# Patient Record
Sex: MALE | Race: WHITE | NOT HISPANIC OR LATINO | Employment: OTHER | URBAN - METROPOLITAN AREA
[De-identification: names, ages, dates, MRNs, and addresses within clinical notes are randomized per-mention and may not be internally consistent; named-entity substitution may affect disease eponyms.]

---

## 2021-11-02 ENCOUNTER — HOSPITAL ENCOUNTER (EMERGENCY)
Facility: HOSPITAL | Age: 46
Discharge: HOME | End: 2021-11-02
Attending: EMERGENCY MEDICINE
Payer: OTHER GOVERNMENT

## 2021-11-02 VITALS
HEIGHT: 67 IN | BODY MASS INDEX: 23.54 KG/M2 | HEART RATE: 81 BPM | SYSTOLIC BLOOD PRESSURE: 131 MMHG | RESPIRATION RATE: 18 BRPM | DIASTOLIC BLOOD PRESSURE: 91 MMHG | WEIGHT: 150 LBS | TEMPERATURE: 98 F | OXYGEN SATURATION: 96 %

## 2021-11-02 DIAGNOSIS — R31.0 GROSS HEMATURIA: Primary | ICD-10-CM

## 2021-11-02 LAB
ANION GAP SERPL CALC-SCNC: 7 MMOL/L (ref 8–16)
BACTERIA #/AREA URNS HPF: ABNORMAL /HPF
BASOPHILS # BLD AUTO: 0.04 K/UL (ref 0–0.2)
BASOPHILS NFR BLD: 0.6 % (ref 0–1.9)
BILIRUB UR QL STRIP: NEGATIVE
BUN SERPL-MCNC: 12 MG/DL (ref 6–20)
CALCIUM SERPL-MCNC: 9.7 MG/DL (ref 8.7–10.5)
CHLORIDE SERPL-SCNC: 104 MMOL/L (ref 95–110)
CLARITY UR: ABNORMAL
CO2 SERPL-SCNC: 29 MMOL/L (ref 23–29)
COLOR UR: ABNORMAL
CREAT SERPL-MCNC: 1.1 MG/DL (ref 0.5–1.4)
DIFFERENTIAL METHOD: ABNORMAL
EOSINOPHIL # BLD AUTO: 0.2 K/UL (ref 0–0.5)
EOSINOPHIL NFR BLD: 2.5 % (ref 0–8)
ERYTHROCYTE [DISTWIDTH] IN BLOOD BY AUTOMATED COUNT: 11.9 % (ref 11.5–14.5)
EST. GFR  (AFRICAN AMERICAN): >60 ML/MIN/1.73 M^2
EST. GFR  (NON AFRICAN AMERICAN): >60 ML/MIN/1.73 M^2
GLUCOSE SERPL-MCNC: 98 MG/DL (ref 70–110)
GLUCOSE UR QL STRIP: NEGATIVE
HCT VFR BLD AUTO: 48.1 % (ref 40–54)
HGB BLD-MCNC: 16.2 G/DL (ref 14–18)
HGB UR QL STRIP: ABNORMAL
HYALINE CASTS #/AREA URNS LPF: 0 /LPF
IMM GRANULOCYTES # BLD AUTO: 0.03 K/UL (ref 0–0.04)
IMM GRANULOCYTES NFR BLD AUTO: 0.4 % (ref 0–0.5)
KETONES UR QL STRIP: NEGATIVE
LEUKOCYTE ESTERASE UR QL STRIP: ABNORMAL
LYMPHOCYTES # BLD AUTO: 1.3 K/UL (ref 1–4.8)
LYMPHOCYTES NFR BLD: 17.7 % (ref 18–48)
MCH RBC QN AUTO: 30.2 PG (ref 27–31)
MCHC RBC AUTO-ENTMCNC: 33.7 G/DL (ref 32–36)
MCV RBC AUTO: 90 FL (ref 82–98)
MICROSCOPIC COMMENT: ABNORMAL
MONOCYTES # BLD AUTO: 0.5 K/UL (ref 0.3–1)
MONOCYTES NFR BLD: 6.8 % (ref 4–15)
NEUTROPHILS # BLD AUTO: 5.2 K/UL (ref 1.8–7.7)
NEUTROPHILS NFR BLD: 72 % (ref 38–73)
NITRITE UR QL STRIP: NEGATIVE
NRBC BLD-RTO: 0 /100 WBC
PH UR STRIP: 6 [PH] (ref 5–8)
PLATELET # BLD AUTO: 267 K/UL (ref 150–450)
PMV BLD AUTO: 9.5 FL (ref 9.2–12.9)
POTASSIUM SERPL-SCNC: 4.5 MMOL/L (ref 3.5–5.1)
PROT UR QL STRIP: ABNORMAL
RBC # BLD AUTO: 5.37 M/UL (ref 4.6–6.2)
RBC #/AREA URNS HPF: >100 /HPF (ref 0–4)
SODIUM SERPL-SCNC: 140 MMOL/L (ref 136–145)
SP GR UR STRIP: >=1.03 (ref 1–1.03)
SQUAMOUS #/AREA URNS HPF: 4 /HPF
URN SPEC COLLECT METH UR: ABNORMAL
UROBILINOGEN UR STRIP-ACNC: NEGATIVE EU/DL
WBC # BLD AUTO: 7.19 K/UL (ref 3.9–12.7)
WBC #/AREA URNS HPF: 27 /HPF (ref 0–5)

## 2021-11-02 PROCEDURE — 85025 COMPLETE CBC W/AUTO DIFF WBC: CPT | Performed by: EMERGENCY MEDICINE

## 2021-11-02 PROCEDURE — 80048 BASIC METABOLIC PNL TOTAL CA: CPT | Performed by: EMERGENCY MEDICINE

## 2021-11-02 PROCEDURE — 87086 URINE CULTURE/COLONY COUNT: CPT | Performed by: EMERGENCY MEDICINE

## 2021-11-02 PROCEDURE — 36415 COLL VENOUS BLD VENIPUNCTURE: CPT | Performed by: EMERGENCY MEDICINE

## 2021-11-02 PROCEDURE — 99283 EMERGENCY DEPT VISIT LOW MDM: CPT | Mod: 25

## 2021-11-02 PROCEDURE — 81000 URINALYSIS NONAUTO W/SCOPE: CPT | Performed by: EMERGENCY MEDICINE

## 2021-11-02 RX ORDER — SODIUM CHLORIDE 9 MG/ML
INJECTION, SOLUTION INTRAVENOUS
Status: DISCONTINUED | OUTPATIENT
Start: 2021-11-02 | End: 2021-11-02

## 2021-11-03 LAB — BACTERIA UR CULT: NO GROWTH

## 2021-11-04 ENCOUNTER — HOSPITAL ENCOUNTER (EMERGENCY)
Facility: HOSPITAL | Age: 46
Discharge: HOME | End: 2021-11-05
Attending: EMERGENCY MEDICINE

## 2021-11-04 DIAGNOSIS — R31.9 URINARY TRACT INFECTION WITH HEMATURIA, SITE UNSPECIFIED: Primary | ICD-10-CM

## 2021-11-04 DIAGNOSIS — M54.9 BACK PAIN, UNSPECIFIED BACK LOCATION, UNSPECIFIED BACK PAIN LATERALITY, UNSPECIFIED CHRONICITY: ICD-10-CM

## 2021-11-04 DIAGNOSIS — N39.0 URINARY TRACT INFECTION WITH HEMATURIA, SITE UNSPECIFIED: Primary | ICD-10-CM

## 2021-11-04 LAB
BACTERIA #/AREA URNS HPF: NEGATIVE /HPF
BILIRUB UR QL STRIP: NEGATIVE
CLARITY UR: ABNORMAL
COLOR UR: ABNORMAL
GLUCOSE UR QL STRIP: NEGATIVE
HGB UR QL STRIP: ABNORMAL
HYALINE CASTS #/AREA URNS LPF: 1 /LPF
KETONES UR QL STRIP: NEGATIVE
LEUKOCYTE ESTERASE UR QL STRIP: ABNORMAL
MICROSCOPIC COMMENT: ABNORMAL
NITRITE UR QL STRIP: NEGATIVE
PH UR STRIP: 6 [PH] (ref 5–8)
PROT UR QL STRIP: ABNORMAL
RBC #/AREA URNS HPF: >100 /HPF (ref 0–4)
SP GR UR STRIP: 1.01 (ref 1–1.03)
SQUAMOUS #/AREA URNS HPF: 2 /HPF
URN SPEC COLLECT METH UR: ABNORMAL
UROBILINOGEN UR STRIP-ACNC: NEGATIVE EU/DL
WBC #/AREA URNS HPF: 9 /HPF (ref 0–5)

## 2021-11-04 PROCEDURE — 81001 URINALYSIS AUTO W/SCOPE: CPT | Performed by: NURSE PRACTITIONER

## 2021-11-04 PROCEDURE — 99284 EMERGENCY DEPT VISIT MOD MDM: CPT | Mod: 25

## 2021-11-05 VITALS
BODY MASS INDEX: 24.11 KG/M2 | TEMPERATURE: 98 F | OXYGEN SATURATION: 97 % | HEIGHT: 66 IN | WEIGHT: 150 LBS | SYSTOLIC BLOOD PRESSURE: 129 MMHG | RESPIRATION RATE: 17 BRPM | DIASTOLIC BLOOD PRESSURE: 92 MMHG | HEART RATE: 80 BPM

## 2021-11-05 PROCEDURE — 96372 THER/PROPH/DIAG INJ SC/IM: CPT | Mod: 59

## 2021-11-05 PROCEDURE — 25000003 PHARM REV CODE 250: Performed by: EMERGENCY MEDICINE

## 2021-11-05 PROCEDURE — 63600175 PHARM REV CODE 636 W HCPCS: Performed by: EMERGENCY MEDICINE

## 2021-11-05 RX ORDER — LIDOCAINE HYDROCHLORIDE 10 MG/ML
1 INJECTION, SOLUTION EPIDURAL; INFILTRATION; INTRACAUDAL; PERINEURAL
Status: COMPLETED | OUTPATIENT
Start: 2021-11-05 | End: 2021-11-05

## 2021-11-05 RX ORDER — DOXYCYCLINE 100 MG/1
100 CAPSULE ORAL 2 TIMES DAILY
Qty: 20 CAPSULE | Refills: 0 | Status: SHIPPED | OUTPATIENT
Start: 2021-11-05 | End: 2021-11-15

## 2021-11-05 RX ORDER — CEFTRIAXONE 1 G/1
1 INJECTION, POWDER, FOR SOLUTION INTRAMUSCULAR; INTRAVENOUS
Status: COMPLETED | OUTPATIENT
Start: 2021-11-05 | End: 2021-11-05

## 2021-11-05 RX ADMIN — CEFTRIAXONE SODIUM 1 G: 1 INJECTION, POWDER, FOR SOLUTION INTRAMUSCULAR; INTRAVENOUS at 12:11

## 2021-11-05 RX ADMIN — LIDOCAINE HYDROCHLORIDE 10 MG: 10 INJECTION, SOLUTION EPIDURAL; INFILTRATION; INTRACAUDAL; PERINEURAL at 12:11

## 2021-12-18 ENCOUNTER — HOSPITAL ENCOUNTER (EMERGENCY)
Facility: HOSPITAL | Age: 46
Discharge: HOME | End: 2021-12-18
Attending: EMERGENCY MEDICINE
Payer: OTHER GOVERNMENT

## 2021-12-18 VITALS
HEIGHT: 66 IN | DIASTOLIC BLOOD PRESSURE: 74 MMHG | OXYGEN SATURATION: 98 % | TEMPERATURE: 98 F | RESPIRATION RATE: 16 BRPM | SYSTOLIC BLOOD PRESSURE: 110 MMHG | HEART RATE: 67 BPM | WEIGHT: 150 LBS | BODY MASS INDEX: 24.11 KG/M2

## 2021-12-18 DIAGNOSIS — R53.83 MALAISE AND FATIGUE: ICD-10-CM

## 2021-12-18 DIAGNOSIS — G44.209 TENSION HEADACHE: Primary | ICD-10-CM

## 2021-12-18 DIAGNOSIS — R53.83 FATIGUE, UNSPECIFIED TYPE: ICD-10-CM

## 2021-12-18 DIAGNOSIS — B34.9 VIRAL SYNDROME: ICD-10-CM

## 2021-12-18 DIAGNOSIS — R53.81 MALAISE AND FATIGUE: ICD-10-CM

## 2021-12-18 LAB
ALBUMIN SERPL BCP-MCNC: 4.1 G/DL (ref 3.5–5.2)
ALP SERPL-CCNC: 89 U/L (ref 55–135)
ALT SERPL W/O P-5'-P-CCNC: 24 U/L (ref 10–44)
ANION GAP SERPL CALC-SCNC: 6 MMOL/L (ref 8–16)
AST SERPL-CCNC: 20 U/L (ref 10–40)
BASOPHILS # BLD AUTO: 0.02 K/UL (ref 0–0.2)
BASOPHILS NFR BLD: 0.3 % (ref 0–1.9)
BILIRUB SERPL-MCNC: 0.7 MG/DL (ref 0.1–1)
BILIRUB UR QL STRIP: NEGATIVE
BUN SERPL-MCNC: 15 MG/DL (ref 6–20)
CALCIUM SERPL-MCNC: 9.9 MG/DL (ref 8.7–10.5)
CHLORIDE SERPL-SCNC: 103 MMOL/L (ref 95–110)
CK SERPL-CCNC: 68 U/L (ref 20–200)
CLARITY UR REFRACT.AUTO: CLEAR
CO2 SERPL-SCNC: 27 MMOL/L (ref 23–29)
COLOR UR AUTO: YELLOW
CREAT SERPL-MCNC: 1 MG/DL (ref 0.5–1.4)
CTP QC/QA: YES
CTP QC/QA: YES
DIFFERENTIAL METHOD: NORMAL
EOSINOPHIL # BLD AUTO: 0.2 K/UL (ref 0–0.5)
EOSINOPHIL NFR BLD: 2.4 % (ref 0–8)
ERYTHROCYTE [DISTWIDTH] IN BLOOD BY AUTOMATED COUNT: 11.5 % (ref 11.5–14.5)
EST. GFR  (AFRICAN AMERICAN): >60 ML/MIN/1.73 M^2
EST. GFR  (NON AFRICAN AMERICAN): >60 ML/MIN/1.73 M^2
GLUCOSE SERPL-MCNC: 84 MG/DL (ref 70–110)
GLUCOSE UR QL STRIP: NEGATIVE
HCT VFR BLD AUTO: 47.5 % (ref 40–54)
HGB BLD-MCNC: 16.1 G/DL (ref 14–18)
HGB UR QL STRIP: NEGATIVE
IMM GRANULOCYTES # BLD AUTO: 0.02 K/UL (ref 0–0.04)
IMM GRANULOCYTES NFR BLD AUTO: 0.3 % (ref 0–0.5)
KETONES UR QL STRIP: NEGATIVE
LEUKOCYTE ESTERASE UR QL STRIP: NEGATIVE
LYMPHOCYTES # BLD AUTO: 1.4 K/UL (ref 1–4.8)
LYMPHOCYTES NFR BLD: 20.2 % (ref 18–48)
MCH RBC QN AUTO: 29.7 PG (ref 27–31)
MCHC RBC AUTO-ENTMCNC: 33.9 G/DL (ref 32–36)
MCV RBC AUTO: 88 FL (ref 82–98)
MICROSCOPIC COMMENT: NORMAL
MONOCYTES # BLD AUTO: 0.5 K/UL (ref 0.3–1)
MONOCYTES NFR BLD: 6.7 % (ref 4–15)
NEUTROPHILS # BLD AUTO: 5 K/UL (ref 1.8–7.7)
NEUTROPHILS NFR BLD: 70.1 % (ref 38–73)
NITRITE UR QL STRIP: NEGATIVE
NRBC BLD-RTO: 0 /100 WBC
PH UR STRIP: 6 [PH] (ref 5–8)
PLATELET # BLD AUTO: 234 K/UL (ref 150–450)
PMV BLD AUTO: 10 FL (ref 9.2–12.9)
POC MOLECULAR INFLUENZA A AGN: NEGATIVE
POC MOLECULAR INFLUENZA B AGN: NEGATIVE
POTASSIUM SERPL-SCNC: 4.5 MMOL/L (ref 3.5–5.1)
PROT SERPL-MCNC: 7.2 G/DL (ref 6–8.4)
PROT UR QL STRIP: NEGATIVE
RBC # BLD AUTO: 5.42 M/UL (ref 4.6–6.2)
RBC #/AREA URNS AUTO: 1 /HPF (ref 0–4)
SARS-COV-2 RDRP RESP QL NAA+PROBE: NEGATIVE
SODIUM SERPL-SCNC: 136 MMOL/L (ref 136–145)
SP GR UR STRIP: 1.01 (ref 1–1.03)
URN SPEC COLLECT METH UR: NORMAL
WBC # BLD AUTO: 7.12 K/UL (ref 3.9–12.7)
WBC #/AREA URNS AUTO: 0 /HPF (ref 0–5)

## 2021-12-18 PROCEDURE — 81001 URINALYSIS AUTO W/SCOPE: CPT | Performed by: EMERGENCY MEDICINE

## 2021-12-18 PROCEDURE — 82550 ASSAY OF CK (CPK): CPT | Performed by: EMERGENCY MEDICINE

## 2021-12-18 PROCEDURE — 63600175 PHARM REV CODE 636 W HCPCS: Performed by: EMERGENCY MEDICINE

## 2021-12-18 PROCEDURE — U0002 COVID-19 LAB TEST NON-CDC: HCPCS | Performed by: EMERGENCY MEDICINE

## 2021-12-18 PROCEDURE — 99284 PR EMERGENCY DEPT VISIT,LEVEL IV: ICD-10-PCS | Mod: CR,CS,, | Performed by: EMERGENCY MEDICINE

## 2021-12-18 PROCEDURE — 99284 EMERGENCY DEPT VISIT MOD MDM: CPT | Mod: 25

## 2021-12-18 PROCEDURE — 87502 INFLUENZA DNA AMP PROBE: CPT

## 2021-12-18 PROCEDURE — 96374 THER/PROPH/DIAG INJ IV PUSH: CPT

## 2021-12-18 PROCEDURE — 25000003 PHARM REV CODE 250: Performed by: EMERGENCY MEDICINE

## 2021-12-18 PROCEDURE — 96361 HYDRATE IV INFUSION ADD-ON: CPT

## 2021-12-18 PROCEDURE — 99284 EMERGENCY DEPT VISIT MOD MDM: CPT | Mod: CR,CS,, | Performed by: EMERGENCY MEDICINE

## 2021-12-18 PROCEDURE — 80053 COMPREHEN METABOLIC PANEL: CPT | Performed by: EMERGENCY MEDICINE

## 2021-12-18 PROCEDURE — 85025 COMPLETE CBC W/AUTO DIFF WBC: CPT | Performed by: EMERGENCY MEDICINE

## 2021-12-18 PROCEDURE — 96375 TX/PRO/DX INJ NEW DRUG ADDON: CPT

## 2021-12-18 RX ORDER — KETOROLAC TROMETHAMINE 30 MG/ML
15 INJECTION, SOLUTION INTRAMUSCULAR; INTRAVENOUS
Status: COMPLETED | OUTPATIENT
Start: 2021-12-18 | End: 2021-12-18

## 2021-12-18 RX ORDER — DIPHENHYDRAMINE HYDROCHLORIDE 50 MG/ML
12.5 INJECTION INTRAMUSCULAR; INTRAVENOUS
Status: COMPLETED | OUTPATIENT
Start: 2021-12-18 | End: 2021-12-18

## 2021-12-18 RX ORDER — BUTALBITAL, ASPIRIN, AND CAFFEINE 325; 50; 40 MG/1; MG/1; MG/1
1 CAPSULE ORAL EVERY 6 HOURS PRN
Qty: 12 CAPSULE | Refills: 0 | Status: SHIPPED | OUTPATIENT
Start: 2021-12-18 | End: 2021-12-21

## 2021-12-18 RX ADMIN — KETOROLAC TROMETHAMINE 15 MG: 30 INJECTION, SOLUTION INTRAMUSCULAR; INTRAVENOUS at 02:12

## 2021-12-18 RX ADMIN — SODIUM CHLORIDE 500 ML: 0.9 INJECTION, SOLUTION INTRAVENOUS at 02:12

## 2021-12-18 RX ADMIN — DIPHENHYDRAMINE HYDROCHLORIDE 12.5 MG: 50 INJECTION INTRAMUSCULAR; INTRAVENOUS at 02:12

## 2021-12-18 NOTE — ED NOTES
46 M arrived to ED w/ Headache x 5 day. Gradual onset and now it is getting worse. Pt alert and oriented x4, VSS,  Airway intact, respirations even and nonlabored, no bowel or bladder incontinence. +2 pulses to all four extremities, no edema or deformities noted. Pt remains on cardiac monitor, nibp, pulse oximeter.  Physical Exam  Constitutional:       Appearance: He is not toxic-appearing.  HENT:     Head: Normocephalic and atraumatic.     Mouth/Throat:     Mouth: Mucous membranes are moist.  Eyes:     Extraocular Movements: Extraocular movements intact.     Conjunctiva/sclera: Conjunctivae normal.     Pupils: Pupils are equal, round, and reactive to light.  Neck:     Vascular: No JVD.  Cardiovascular:     Rate and Rhythm: Normal rate and regular rhythm.     Heart sounds: Normal heart sounds. No murmur. No friction rub. No gallop.    Pulmonary:     Effort: Pulmonary effort is normal. No respiratory distress.     Breath sounds: Normal breath sounds. No wheezing or rales.  Abdominal:     General: Bowel sounds are normal. There is no distension.     Palpations: Abdomen is soft.     Tenderness: There is no tenderness reported. There is no guarding or rebound.     Hernia: No hernia is present.  Skin:     General: Skin is warm and dry.     Findings: No rash.  Neurological:     General: No focal deficit present.     Mental Status: He is alert and oriented to person, place, and time.     Cranial Nerves: No cranial nerve deficit.     Sensory: No sensory deficit.

## 2021-12-18 NOTE — DISCHARGE INSTRUCTIONS
Please call the phone number above and obtain a primary care physician.  You may use Tylenol or ibuprofen as needed for your symptoms.    Our goal in the emergency department is to always give you outstanding care and exceptional service. You may receive a survey by mail or e-mail in the next week regarding your experience in our ED. We would greatly appreciate your completing and returning the survey. Your feedback provides us with a way to recognize our staff who give very good care and it helps us learn how to improve when your experience was below our aspiration of excellence.

## 2021-12-18 NOTE — ED PROVIDER NOTES
Encounter Date: 12/18/2021    SCRIBE #1 NOTE: I, Alda Vila, am scribing for, and in the presence of,  Ermias Platt DO. I have scribed the following portions of the note - Other sections scribed: HPI, ROS.       History     Chief Complaint   Patient presents with    Headache     Ben Hillman is a 46 y.o. male with a past medical history of kidney stones who presents to the ER with a chief complaint of headache at the back of his head rated at moderate severity, without visual changes, eye pain, or neurologic deficits. He notes that he has weakness, fatigue, and body aches. Patient denies any fever, chills, or urinary problems. He notes that he has taken no medication for his symptoms and that he has no recent trauma to the head.  Onset has been gradual, and continuous, patient states he was possibly diagnosed with hepatitis-C in the past.  He denies any recent falls, ingestion of medications, drugs, alcohol but is unclear if he has been exposed to any infectious etiology to include COVID-19 or flu.  He reports he has had kidney stones in the past, and feels generalized body aches with no focal flank pain, abdominal pain or urinary pain.  He denies any hematochezia, melena, hemoptysis, hematuria, or hematemesis.  He is complaining of generalized fatigue, malaise, body aches and feeling unwell.    The history is provided by the patient and medical records. No  was used.     Review of patient's allergies indicates:  No Known Allergies  Past Medical History:   Diagnosis Date    Kidney stones      Past Surgical History:   Procedure Laterality Date    HERNIA REPAIR      URETERAL STENT PLACEMENT       No family history on file.     Review of Systems   Constitutional: Negative for chills and fever.   HENT: Negative for sore throat and trouble swallowing.    Eyes: Negative for photophobia and visual disturbance.   Respiratory: Negative for shortness of breath and wheezing.    Cardiovascular:  Negative for chest pain and palpitations.   Gastrointestinal: Positive for abdominal pain. Negative for nausea and vomiting.   Genitourinary: Negative for dysuria and frequency.   Musculoskeletal: Negative for back pain and neck pain.   Skin: Negative for color change and wound.   Neurological: Positive for weakness and headaches.   Hematological: Negative for adenopathy. Does not bruise/bleed easily.   Psychiatric/Behavioral: Negative for sleep disturbance and suicidal ideas.       Physical Exam     Initial Vitals [12/18/21 1140]   BP Pulse Resp Temp SpO2   136/84 67 16 98.5 °F (36.9 °C) 99 %      MAP       --         Physical Exam    Nursing note and vitals reviewed.      Gen/Constitutional: Interactive. No acute distress, appears fatigued  Head: Normocephalic, Atraumatic  Neck: supple, no masses or LAD, no JVD  Eyes: PERRLA, conjunctiva clear  Ears, Nose and Throat: No rhinorrhea or stridor.  Cardiac:  Regular rate, Reg Rhythm, No murmur  Pulmonary: CTA Bilat, no wheezes, rhonchi, rales.  GI: Abdomen soft, non-tender, non-distended; no rebound or guarding  : No CVA tenderness.  Musculoskeletal: Extremities warm, well perfused, no erythema, no edema  Skin: No rashes  Psych:  Flat affect, anxious  Detailed Neurologic exam:  Mental Status:  Normal attention and reasoning. There is no evidence of a thought disorder. Affect and mood were unremarkable. Speech was normal and prosody was intact. Verbal expression and comprehension are intact. Immediate recall, recent and remote memory were intact.  Neck:  Supple. No cervical bruits.  Cranial Nerves:  Visual fields were normal to confrontation and visual acuity was at baseline. Funduscopy was limited by pupillary light response. Pupils were of equal (4mm to 3mm bilaterally) and exhibited a normal direct and consensual response to light. There was no APD. Ocular motility was full and there was no nystagmus evident. Strength of masticatory muscles was normal. Facial  sensation was normal. Corneal reflexes were present. No facial weakness. Hearing acuity was normal. Palatal movements and gag reflex were intact. Sternocleidomastoid and trapezii strength were normal. Tongue protruded in the midline and showed no atrophy, tremor or fasciculations.  Motor Examination (bulk, tone, strength):  Motor examination showed normal muscle bulk, tone, and strength throughout. Rapid alternating movements were normal. There were no adventitious movements noted.  Muscle Stretch reflexes (MSR's):  Muscle stretch reflexes were symmetric and normoactive. Plantar responses were flexor bilaterally. No pathologic reflexes were appreciated.  Sensory:  Normal light-touch and position sense.  Cerebellar and coordination:  Coordination examination showed normal rapid alternating movements. Finger-finger and finger-nose testing were unremarkable.  Romberg test:  Romberg was negative.  Gait and Station:  Erect posture was normal. There was no postural instability.   Spine:  Normal range of motion. No tenderness on palpation. SLR negative.      ED Course   Procedures  Labs Reviewed   COMPREHENSIVE METABOLIC PANEL - Abnormal; Notable for the following components:       Result Value    Anion Gap 6 (*)     All other components within normal limits   CBC W/ AUTO DIFFERENTIAL   URINALYSIS, REFLEX TO URINE CULTURE    Narrative:     Specimen Source->Urine   CK   URINALYSIS MICROSCOPIC    Narrative:     Specimen Source->Urine   SARS-COV-2 RDRP GENE   POCT INFLUENZA A/B MOLECULAR          Imaging Results    None          Medications   ketorolac injection 15 mg (15 mg Intravenous Given 12/18/21 1417)   diphenhydrAMINE injection 12.5 mg (12.5 mg Intravenous Given 12/18/21 1408)   sodium chloride 0.9% bolus 500 mL (0 mLs Intravenous Stopped 12/18/21 1529)     Medical Decision Making:   History:   Old Medical Records: I decided to obtain old medical records.  Old Records Summarized: records from clinic visits.  Initial  Assessment:   Ben Hillman is a 46 y.o. male with a past medical history of kidney stones who presents to the ER with a chief complaint of headache at the back of his head.   Differential Diagnosis:   Viral syndrome, COVID-19, influenza, migraine headache, tension headache, fatigue, dehydration  Clinical Tests:   Lab Tests: Ordered and Reviewed    Afebrile vital signs stable.  Physical exam findings unremarkable with no focal neurologic deficits, lung sounds clear, cardiac exam unremarkable, no abdominal peritoneal findings.  Unclear etiology, broad workup with laboratory and viral testing.  Negative for COVID-19, negative for influenza.  Laboratory findings unremarkable with no elevated creatinine, no signs of liver failure, no acidosis, no anemia, and no elevated CK.  No evidence of rhabdomyolysis, suspect likely viral syndrome given broad symptoms.  Headache treated with Toradol, diphenhydramine, with improvement.  Patient was given fluids for dehydration.  He was observed in the ED for approximately 5 hours, with no abnormal vital signs, change in neurologic status, worsening symptoms.  Suspect likely migraine variant, or tension headache associated viral syndrome.  Instructed on outpatient follow-up.  UA without signs of UTI or hematuria. Patient agreeable to discharge plan. Strict ED precautions and return instructions discussed at length and patient verbalized understanding. All questions were answered and ample time was given for questions.      Complexity:  Moderate high        Scribe Attestation:   Scribe #2: I performed the above scribed service and the documentation accurately describes the services I performed. I attest to the accuracy of the note.               I, Dr. Ermias Platt, personally performed the services described in this documentation. All medical record entries made by the scribe were at my direction and in my presence.  I have reviewed the chart and agree that the record reflects my  personal performance and is accurate and complete.     Clinical Impression:   Final diagnoses:  [G44.209] Tension headache (Primary)  [R53.83] Fatigue, unspecified type  [R53.81, R53.83] Malaise and fatigue  [B34.9] Viral syndrome          ED Disposition Condition    Discharge Stable          ED Prescriptions     Medication Sig Dispense Start Date End Date Auth. Provider    butalbital-aspirin-caffeine -40 mg (FIORINAL) -40 mg Cap Take 1 capsule by mouth every 6 (six) hours as needed (headache). 12 capsule 12/18/2021 12/21/2021 Ermias Platt DO        Follow-up Information     Follow up With Specialties Details Why Contact Info    [facility]  Schedule an appointment as soon as possible for a visit in 1 week  [address] [facility] [address]  [phone]          Ermias Platt DO, [facility]  Emergency Staff Physician   Dept of Emergency Medicine   [facility]  Spectralink: [id number]        Disclaimer: This note has been generated using voice-recognition software. There may be typographical errors that have been missed during proof-reading.     Ermias Platt DO  12/19/21 0776